# Patient Record
Sex: FEMALE | URBAN - METROPOLITAN AREA
[De-identification: names, ages, dates, MRNs, and addresses within clinical notes are randomized per-mention and may not be internally consistent; named-entity substitution may affect disease eponyms.]

---

## 2022-02-24 ENCOUNTER — APPOINTMENT (OUTPATIENT)
Dept: URBAN - METROPOLITAN AREA CLINIC 276 | Age: 27
Setting detail: DERMATOLOGY
End: 2022-03-02

## 2022-02-24 DIAGNOSIS — B35.1 TINEA UNGUIUM: ICD-10-CM

## 2022-02-24 PROBLEM — L60.9 NAIL DISORDER, UNSPECIFIED: Status: ACTIVE | Noted: 2022-02-24

## 2022-02-24 PROCEDURE — OTHER MIPS QUALITY: OTHER

## 2022-02-24 PROCEDURE — OTHER COUNSELING: OTHER

## 2022-02-24 PROCEDURE — OTHER PRESCRIPTION: OTHER

## 2022-02-24 PROCEDURE — OTHER ADDITIONAL NOTES: OTHER

## 2022-02-24 PROCEDURE — 99202 OFFICE O/P NEW SF 15 MIN: CPT

## 2022-02-24 RX ORDER — EFINACONAZOLE 100 MG/ML
SOLUTION TOPICAL
Qty: 8 | Refills: 0 | Status: ERX | COMMUNITY
Start: 2022-02-24

## 2022-02-24 NOTE — PROCEDURE: ADDITIONAL NOTES
Render Risk Assessment In Note?: no
Detail Level: Simple
Additional Notes: Patient denies previous trauma to the nails, but states they have always grown very thick and slightly white/yellow discolored.\\n\\nDiscussed treatment options in depth - pt will RTC for worsening/change or when she has insurance for fungal culture, bloodwork, and consideration of oral treatment.
Additional Notes: Suggested Nail Clipping for PAS, however patient declines as patient is a self-pay patient.  Patient will return to clinic once she gets insurance for definitive diagnosis and oral treatment.  In the meanwhile will prescribe a topical medication to use.